# Patient Record
Sex: MALE | Race: WHITE | NOT HISPANIC OR LATINO | Employment: FULL TIME | URBAN - METROPOLITAN AREA
[De-identification: names, ages, dates, MRNs, and addresses within clinical notes are randomized per-mention and may not be internally consistent; named-entity substitution may affect disease eponyms.]

---

## 2023-03-30 PROBLEM — J34.2 DEVIATED SEPTUM: Status: ACTIVE | Noted: 2023-03-30

## 2023-03-30 PROBLEM — F41.9 ANXIETY AND DEPRESSION: Status: ACTIVE | Noted: 2023-03-30

## 2023-03-30 PROBLEM — F32.A ANXIETY AND DEPRESSION: Status: ACTIVE | Noted: 2023-03-30

## 2023-03-30 PROBLEM — J34.3 HYPERTROPHY OF INFERIOR NASAL TURBINATE: Status: ACTIVE | Noted: 2023-03-30

## 2024-04-03 ENCOUNTER — OFFICE VISIT (OUTPATIENT)
Dept: PRIMARY CARE | Facility: CLINIC | Age: 29
End: 2024-04-03
Payer: COMMERCIAL

## 2024-04-03 VITALS
OXYGEN SATURATION: 97 % | WEIGHT: 240 LBS | DIASTOLIC BLOOD PRESSURE: 78 MMHG | BODY MASS INDEX: 32.58 KG/M2 | HEART RATE: 79 BPM | SYSTOLIC BLOOD PRESSURE: 124 MMHG

## 2024-04-03 DIAGNOSIS — M25.562 LEFT KNEE PAIN, UNSPECIFIED CHRONICITY: Primary | ICD-10-CM

## 2024-04-03 PROBLEM — J33.9 NASAL POLYP: Status: ACTIVE | Noted: 2024-04-03

## 2024-04-03 PROCEDURE — 1036F TOBACCO NON-USER: CPT | Performed by: FAMILY MEDICINE

## 2024-04-03 PROCEDURE — 99213 OFFICE O/P EST LOW 20 MIN: CPT | Performed by: FAMILY MEDICINE

## 2024-04-03 RX ORDER — MELOXICAM 15 MG/1
15 TABLET ORAL DAILY PRN
Qty: 30 TABLET | Refills: 0 | Status: SHIPPED | OUTPATIENT
Start: 2024-04-03

## 2024-04-03 ASSESSMENT — ENCOUNTER SYMPTOMS: ARTHRALGIAS: 1

## 2024-04-03 NOTE — PROGRESS NOTES
Subjective   Patient ID: Arthur Anderson is a 28 y.o. male who presents for Leg Pain (Shin pain left side. On and off edema. 5/10 Dull ache pain. Increased pain to the touch or being bumped/hit. ).    HPI       He is here today to discuss knee pain  This has been present for approximately 1 month.  There is no injury prior to onset, however he did recently start a new job at a  in January and does a lot of bending and squatting  Pain is located anterior and just inferior to the knee.  It is present all of the time, and it is typically no more than a 5 out of 10 intensity.  Pain is sharp and gets especially worse when he bumps it.  Takes ibuprofen with partial improvement  He also mentions that he will get recurrent sores in his mouth.  These are now occurring every 1 to 2 months and last 7 to 10 days.  They can be painful when they occur.      Review of Systems   Musculoskeletal:  Positive for arthralgias.       Objective   /78   Pulse 79   Wt 109 kg (240 lb)   SpO2 97%   BMI 32.58 kg/m²     Physical Exam  Constitutional:       General: He is not in acute distress.     Appearance: Normal appearance. He is well-developed.   HENT:      Mouth/Throat:      Comments: There is a shallow, gray-colored, 5-6 mm aphthous ulcer located  just below right lower lip inside of mouth  Pulmonary:      Effort: Pulmonary effort is normal.   Musculoskeletal:         General: Tenderness present.      Comments: Left knee: There is tenderness over the tibial tuberosity and also along the patellar tendon.  No tenderness over the patella or lateral or medial joint spaces.  He notes some increased pain with knee extension against resistance.  Negative Moira's test.  Knee strength is intact   Skin:     Findings: No rash.   Neurological:      Mental Status: He is alert.   Psychiatric:         Mood and Affect: Mood normal.         Behavior: Behavior normal.         Assessment/Plan   Problem List Items Addressed This  Visit    None  Visit Diagnoses       Left knee pain, unspecified chronicity    -  Primary    Relevant Medications    meloxicam (Mobic) 15 mg tablet        Suspect that his knee pain is due to patellar tendinitis.  We discussed avoiding any aggravating factors and avoiding overuse, and will start meloxicam 15 mg daily as needed.  If pain is not resolving in the next 2 to 4 weeks, recommended that he call and we will consider an x-ray and PT referral at that time.  The sore in his mouth appears to be a canker sore.  We discussed using over-the-counter Orajel if painful, and follow-up if this becomes more frequent

## 2024-08-13 ENCOUNTER — OFFICE VISIT (OUTPATIENT)
Dept: PRIMARY CARE | Facility: CLINIC | Age: 29
End: 2024-08-13
Payer: COMMERCIAL

## 2024-08-13 VITALS
BODY MASS INDEX: 31.76 KG/M2 | SYSTOLIC BLOOD PRESSURE: 126 MMHG | DIASTOLIC BLOOD PRESSURE: 83 MMHG | OXYGEN SATURATION: 97 % | WEIGHT: 234 LBS | HEART RATE: 80 BPM | TEMPERATURE: 97.3 F

## 2024-08-13 DIAGNOSIS — R51.9 NONINTRACTABLE HEADACHE, UNSPECIFIED CHRONICITY PATTERN, UNSPECIFIED HEADACHE TYPE: Primary | ICD-10-CM

## 2024-08-13 DIAGNOSIS — F41.9 ANXIETY AND DEPRESSION: ICD-10-CM

## 2024-08-13 DIAGNOSIS — F32.A ANXIETY AND DEPRESSION: ICD-10-CM

## 2024-08-13 PROCEDURE — 99214 OFFICE O/P EST MOD 30 MIN: CPT | Performed by: FAMILY MEDICINE

## 2024-08-13 PROCEDURE — 1036F TOBACCO NON-USER: CPT | Performed by: FAMILY MEDICINE

## 2024-08-13 RX ORDER — ESCITALOPRAM OXALATE 5 MG/1
5 TABLET ORAL DAILY
Qty: 90 TABLET | Refills: 3 | Status: SHIPPED | OUTPATIENT
Start: 2024-08-13

## 2024-08-13 RX ORDER — MELOXICAM 15 MG/1
15 TABLET ORAL DAILY PRN
Qty: 30 TABLET | Refills: 0 | Status: SHIPPED | OUTPATIENT
Start: 2024-08-13

## 2024-08-13 ASSESSMENT — ENCOUNTER SYMPTOMS
COUGH: 0
VOMITING: 0
NAUSEA: 0
WEAKNESS: 0
HEADACHES: 1
FEVER: 0
NECK PAIN: 1
NUMBNESS: 0

## 2024-08-13 ASSESSMENT — ANXIETY QUESTIONNAIRES
6. BECOMING EASILY ANNOYED OR IRRITABLE: NEARLY EVERY DAY
4. TROUBLE RELAXING: NEARLY EVERY DAY
5. BEING SO RESTLESS THAT IT IS HARD TO SIT STILL: NOT AT ALL
3. WORRYING TOO MUCH ABOUT DIFFERENT THINGS: SEVERAL DAYS
7. FEELING AFRAID AS IF SOMETHING AWFUL MIGHT HAPPEN: NOT AT ALL
1. FEELING NERVOUS, ANXIOUS, OR ON EDGE: NEARLY EVERY DAY
GAD7 TOTAL SCORE: 11
2. NOT BEING ABLE TO STOP OR CONTROL WORRYING: SEVERAL DAYS

## 2024-08-13 NOTE — PROGRESS NOTES
Subjective   Patient ID: Arthur Anderson is a 29 y.o. male who presents for Headache.    Headache   This is a new problem. The pain is located in the Frontal region. Radiates to: neck. The quality of the pain is described as band-like and dull. The pain is at a severity of 2/10. Associated symptoms include neck pain. Pertinent negatives include no coughing, fever, nausea, numbness, vomiting or weakness. Treatments tried: IBU. The treatment provided moderate relief.     Here today to discuss headaches  Headaches have been present for 3 weeks.  No history of similar headaches in the past  The headache seems to start in his bilateral neck area and also his bilateral occiput.  He also notices a headache involving his bilateral frontal area  He describes this as a constant, 2 out of 10 intensity dull pain.  No accompanying vision changes.  No focal numbness or weakness.  No dizziness  Neck pain does not radiate to the upper extremities  He has been taking NSAIDs with some improvement  Stress can sometimes trigger his headaches  He does have a history of anxiety and depression which is currently untreated.  He was previously taking Lexapro 5 mg which did seem to help.  Over the last month he has had increased stress and anxiety.  He is in grad school.  Gets infrequent panic attacks.  He does admit to feeling more anxious but has not had any significant depression symptoms  His JARED score today is 11      Review of Systems   Constitutional:  Negative for fever.   Respiratory:  Negative for cough.    Gastrointestinal:  Negative for nausea and vomiting.   Musculoskeletal:  Positive for neck pain.   Neurological:  Positive for headaches. Negative for weakness and numbness.       Objective   /83   Pulse 80   Temp 36.3 °C (97.3 °F) (Temporal)   Wt 106 kg (234 lb)   SpO2 97%   BMI 31.76 kg/m²     Physical Exam  Vitals reviewed.   Constitutional:       General: He is not in acute distress.     Appearance: Normal  appearance.   HENT:      Head: Normocephalic.      Right Ear: Tympanic membrane, ear canal and external ear normal.      Left Ear: Tympanic membrane, ear canal and external ear normal.      Nose: Nose normal.      Mouth/Throat:      Mouth: Mucous membranes are moist.      Pharynx: No oropharyngeal exudate or posterior oropharyngeal erythema.   Eyes:      Extraocular Movements: Extraocular movements intact.      Conjunctiva/sclera: Conjunctivae normal.      Pupils: Pupils are equal, round, and reactive to light.   Cardiovascular:      Rate and Rhythm: Normal rate and regular rhythm.      Heart sounds: Normal heart sounds.   Pulmonary:      Effort: Pulmonary effort is normal.      Breath sounds: Normal breath sounds.   Musculoskeletal:         General: Tenderness present.      Comments: Mild tenderness involving bilateral upper cervical paraspinals.  Full active left and right cervical rotation   Lymphadenopathy:      Cervical: No cervical adenopathy.   Skin:     Findings: No rash.   Neurological:      General: No focal deficit present.      Mental Status: He is alert and oriented to person, place, and time.      Sensory: No sensory deficit.      Motor: No weakness.      Deep Tendon Reflexes: Reflexes normal.      Comments: Cranial nerves II through XII are intact.  Upper and lower extremity sensation intact.  Patellar reflexes plus 2 out of 4 bilaterally.  Upper and lower extremity strength is plus 5 out of 5 bilaterally   Psychiatric:         Mood and Affect: Mood normal.         Behavior: Behavior normal.         Assessment/Plan   Assessment & Plan  Anxiety and depression    Orders:    escitalopram (Lexapro) 5 mg tablet; Take 1 tablet (5 mg) by mouth once daily.    Nonintractable headache, unspecified chronicity pattern, unspecified headache type    Orders:    CBC; Future    Comprehensive Metabolic Panel; Future    Lipid Panel; Future    meloxicam (Mobic) 15 mg tablet; Take 1 tablet (15 mg) by mouth once daily as  needed (pain).    Headache: He presents today with a 3-week history of a headache which is most noticeable in his upper neck and occipital area.  Today he has a completely normal neurologic exam  Based on location and description, we discussed that his headaches are most likely I suspect that his headaches may possibly be muscular and related to neck pain.  We will start meloxicam 15 mg daily as needed and follow-up in 1 month for recheck  He has had worsening anxiety over the past month and has noticed that stress can worsen his headaches, it is possible that this may be contributing to his headaches also.  His JARED-7 score today is 11, indicating moderate anxiety.  We will restart Lexapro 5 mg daily and follow-up in 1 month for recheck  I did recommend that he contact me if he develops any worsening headaches or other new symptoms  Follow-up in 1 month for recheck on headaches and anxiety

## 2024-08-19 ENCOUNTER — APPOINTMENT (OUTPATIENT)
Dept: PRIMARY CARE | Facility: CLINIC | Age: 29
End: 2024-08-19
Payer: COMMERCIAL

## 2024-10-01 ENCOUNTER — APPOINTMENT (OUTPATIENT)
Dept: PRIMARY CARE | Facility: CLINIC | Age: 29
End: 2024-10-01
Payer: COMMERCIAL

## 2024-11-05 ENCOUNTER — APPOINTMENT (OUTPATIENT)
Dept: PRIMARY CARE | Facility: CLINIC | Age: 29
End: 2024-11-05
Payer: COMMERCIAL

## 2025-01-30 ENCOUNTER — OFFICE VISIT (OUTPATIENT)
Dept: URGENT CARE | Age: 30
End: 2025-01-30
Payer: COMMERCIAL

## 2025-01-30 VITALS
RESPIRATION RATE: 15 BRPM | DIASTOLIC BLOOD PRESSURE: 83 MMHG | OXYGEN SATURATION: 99 % | TEMPERATURE: 97.7 F | HEART RATE: 89 BPM | SYSTOLIC BLOOD PRESSURE: 145 MMHG

## 2025-01-30 DIAGNOSIS — J06.9 UPPER RESPIRATORY TRACT INFECTION, UNSPECIFIED TYPE: ICD-10-CM

## 2025-01-30 DIAGNOSIS — R68.89 FLU-LIKE SYMPTOMS: Primary | ICD-10-CM

## 2025-01-30 LAB
POC RAPID INFLUENZA A: NEGATIVE
POC RAPID INFLUENZA B: NEGATIVE
POC SARS-COV-2 AG BINAX: NORMAL

## 2025-01-30 RX ORDER — BROMPHENIRAMINE MALEATE, PSEUDOEPHEDRINE HYDROCHLORIDE, AND DEXTROMETHORPHAN HYDROBROMIDE 2; 30; 10 MG/5ML; MG/5ML; MG/5ML
10 SYRUP ORAL 4 TIMES DAILY PRN
Qty: 240 ML | Refills: 0 | Status: SHIPPED | OUTPATIENT
Start: 2025-01-30 | End: 2025-02-04

## 2025-01-30 RX ORDER — IBUPROFEN 800 MG/1
800 TABLET ORAL EVERY 8 HOURS PRN
Qty: 30 TABLET | Refills: 0 | Status: SHIPPED | OUTPATIENT
Start: 2025-01-30

## 2025-01-30 NOTE — PROGRESS NOTES
Subjective   Patient ID: Arthur Anderson is a 29 y.o. male. He presents today with a chief complaint of Illness (Fever and chills, 4-5 days. This morning fever. Some nausea. ).    History of Present Illness  Subjective  Arthur Anderson is a 29 y.o. male who presents for evaluation of symptoms of a URI. Symptoms include achiness, cough described as nonproductive, and fever. Onset of symptoms was 4 days ago and has been unchanged since that time. Treatment to date: Motrin 200mg.          Illness      Past Medical History  Allergies as of 01/30/2025    (No Known Allergies)       (Not in a hospital admission)       Past Medical History:   Diagnosis Date    Anxiety     Depression     Headache        Past Surgical History:   Procedure Laterality Date    OTHER SURGICAL HISTORY  06/11/2021    No history of surgery    SINUS SURGERY          reports that he has never smoked. He has never been exposed to tobacco smoke. He has never used smokeless tobacco. He reports that he does not drink alcohol and does not use drugs.    Review of Systems  Review of Systems                               Objective    Vitals:    01/30/25 0823   BP: 145/83   Pulse: 89   Resp: 15   Temp: 36.5 °C (97.7 °F)   SpO2: 99%     No LMP for male patient.    Physical Exam  Vitals and nursing note reviewed.   Constitutional:       General: He is not in acute distress.     Appearance: Normal appearance. He is not toxic-appearing.   HENT:      Right Ear: Tympanic membrane, ear canal and external ear normal.      Left Ear: Tympanic membrane, ear canal and external ear normal.      Nose: Congestion present.      Mouth/Throat:      Mouth: Mucous membranes are moist.      Pharynx: Oropharynx is clear.   Eyes:      Extraocular Movements: Extraocular movements intact.      Conjunctiva/sclera: Conjunctivae normal.      Pupils: Pupils are equal, round, and reactive to light.   Cardiovascular:      Rate and Rhythm: Normal rate and regular rhythm.      Pulses: Normal  pulses.      Heart sounds: Normal heart sounds.   Pulmonary:      Effort: Pulmonary effort is normal.      Breath sounds: Normal breath sounds. No wheezing, rhonchi or rales.   Musculoskeletal:      Cervical back: Normal range of motion and neck supple. No rigidity or tenderness.   Lymphadenopathy:      Cervical: No cervical adenopathy.   Neurological:      Mental Status: He is alert.         Procedures    Point of Care Test & Imaging Results from this visit  Results for orders placed or performed in visit on 01/30/25   POCT Covid-19 Rapid Antigen   Result Value Ref Range    POC MACARIO-COV-2 AG  Presumptive negative test for SARS-CoV-2 (no antigen detected)     Presumptive negative test for SARS-CoV-2 (no antigen detected)   POCT Influenza A/B manually resulted   Result Value Ref Range    POC Rapid Influenza A Negative Negative    POC Rapid Influenza B Negative Negative      No results found.    Diagnostic study results (if any) were reviewed by Awa Pollack MD.    Assessment/Plan   Allergies, medications, history, and pertinent labs/EKGs/Imaging reviewed by Awa Pollack MD.     Medical Decision Making      Orders and Diagnoses  Diagnoses and all orders for this visit:  Flu-like symptoms  -     POCT Covid-19 Rapid Antigen  -     POCT Influenza A/B manually resulted      Medical Admin Record      Patient disposition: Home    Electronically signed by Awa Pollack MD  8:34 AM

## 2025-01-30 NOTE — PROGRESS NOTES
Subjective   Patient ID: Arthur Anderson is a 29 y.o. male. They present today with a chief complaint of Illness (Fever and chills, 4-5 days. This morning fever. Some nausea. ).    History of Present Illness  HPI    Past Medical History  Allergies as of 01/30/2025    (No Known Allergies)       (Not in a hospital admission)       Past Medical History:   Diagnosis Date    Anxiety     Depression     Headache        Past Surgical History:   Procedure Laterality Date    OTHER SURGICAL HISTORY  06/11/2021    No history of surgery    SINUS SURGERY          reports that he has never smoked. He has never been exposed to tobacco smoke. He has never used smokeless tobacco. He reports that he does not drink alcohol and does not use drugs.    Review of Systems  Review of Systems                               Objective    Vitals:    01/30/25 0823   Resp: 15   Temp: 36.5 °C (97.7 °F)   SpO2: 99%     No LMP for male patient.    Physical Exam    Procedures    Point of Care Test & Imaging Results from this visit  No results found for this visit on 01/30/25.   No results found.    Diagnostic study results (if any) were reviewed by ANKUR Galan.    Assessment/Plan   Allergies, medications, history, and pertinent labs/EKGs/Imaging reviewed by ANKUR Galan.     Medical Decision Making  ***    Orders and Diagnoses  There are no diagnoses linked to this encounter.    Medical Admin Record      Patient disposition: { Disposition:[id number]}    Electronically signed by ANKUR Galan  8:25 AM

## 2025-01-30 NOTE — LETTER
January 30, 2025     Patient: Arthur Anderson   YOB: 1995   Date of Visit: 1/30/2025       To Whom It May Concern:    Arthur Anderson was seen in my clinic on 1/30/2025 at 8:25 am. Please excuse Arthru for his absence from work 1/27/25 through 1/31/25 .    If you have any questions or concerns, please don't hesitate to call.         Sincerely,         Awa Pollack MD        CC: No Recipients

## 2025-02-13 ENCOUNTER — APPOINTMENT (OUTPATIENT)
Dept: PRIMARY CARE | Facility: CLINIC | Age: 30
End: 2025-02-13
Payer: COMMERCIAL

## 2025-08-13 ENCOUNTER — OFFICE VISIT (OUTPATIENT)
Dept: URGENT CARE | Age: 30
End: 2025-08-13
Payer: COMMERCIAL

## 2025-08-13 VITALS
TEMPERATURE: 97.6 F | RESPIRATION RATE: 16 BRPM | OXYGEN SATURATION: 97 % | BODY MASS INDEX: 30.13 KG/M2 | DIASTOLIC BLOOD PRESSURE: 82 MMHG | WEIGHT: 222 LBS | HEART RATE: 77 BPM | SYSTOLIC BLOOD PRESSURE: 142 MMHG

## 2025-08-13 DIAGNOSIS — B37.89 CANDIDA RASH OF GROIN: ICD-10-CM

## 2025-08-13 DIAGNOSIS — M54.31 SCIATICA OF RIGHT SIDE: Primary | ICD-10-CM

## 2025-08-13 PROCEDURE — 99214 OFFICE O/P EST MOD 30 MIN: CPT

## 2025-08-13 PROCEDURE — 1036F TOBACCO NON-USER: CPT

## 2025-08-13 RX ORDER — NYSTATIN 100000 [USP'U]/G
1 POWDER TOPICAL 2 TIMES DAILY PRN
Qty: 30 G | Refills: 0 | Status: SHIPPED | OUTPATIENT
Start: 2025-08-13

## 2025-08-13 RX ORDER — METHYLPREDNISOLONE 4 MG/1
TABLET ORAL
Qty: 21 TABLET | Refills: 0 | Status: SHIPPED | OUTPATIENT
Start: 2025-08-13

## 2025-08-13 RX ORDER — CYCLOBENZAPRINE HCL 10 MG
10 TABLET ORAL 3 TIMES DAILY PRN
Qty: 20 TABLET | Refills: 0 | Status: SHIPPED | OUTPATIENT
Start: 2025-08-13

## 2025-08-13 RX ORDER — IBUPROFEN 800 MG/1
800 TABLET, FILM COATED ORAL EVERY 8 HOURS PRN
Qty: 30 TABLET | Refills: 0 | Status: SHIPPED | OUTPATIENT
Start: 2025-08-13

## 2025-08-13 ASSESSMENT — ENCOUNTER SYMPTOMS: BACK PAIN: 1

## 2025-09-29 ENCOUNTER — APPOINTMENT (OUTPATIENT)
Facility: CLINIC | Age: 30
End: 2025-09-29
Payer: COMMERCIAL